# Patient Record
Sex: FEMALE | Race: WHITE
[De-identification: names, ages, dates, MRNs, and addresses within clinical notes are randomized per-mention and may not be internally consistent; named-entity substitution may affect disease eponyms.]

---

## 2019-01-29 ENCOUNTER — HOSPITAL ENCOUNTER (OUTPATIENT)
Dept: HOSPITAL 90 - RAH | Age: 69
Discharge: HOME | End: 2019-01-29
Attending: INTERNAL MEDICINE
Payer: MEDICARE

## 2019-01-29 DIAGNOSIS — S52.601A: ICD-10-CM
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
## 2019-01-29 DIAGNOSIS — S52.501A: Primary | ICD-10-CM
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
## 2019-01-29 PROCEDURE — 73120 X-RAY EXAM OF HAND: CPT

## 2019-01-29 PROCEDURE — 73090 X-RAY EXAM OF FOREARM: CPT

## 2019-01-29 PROCEDURE — 73100 X-RAY EXAM OF WRIST: CPT

## 2025-01-20 ENCOUNTER — HOSPITAL ENCOUNTER (EMERGENCY)
Dept: HOSPITAL 90 - EDH | Age: 75
Discharge: HOME | End: 2025-01-20
Payer: MEDICARE

## 2025-01-20 VITALS — SYSTOLIC BLOOD PRESSURE: 147 MMHG | OXYGEN SATURATION: 99 % | TEMPERATURE: 98.4 F | DIASTOLIC BLOOD PRESSURE: 84 MMHG

## 2025-01-20 VITALS — HEIGHT: 61 IN | BODY MASS INDEX: 20.77 KG/M2 | WEIGHT: 110.01 LBS

## 2025-01-20 VITALS — HEART RATE: 78 BPM | RESPIRATION RATE: 20 BRPM

## 2025-01-20 DIAGNOSIS — Z79.899: ICD-10-CM

## 2025-01-20 DIAGNOSIS — J45.901: Primary | ICD-10-CM

## 2025-01-20 LAB
BASOPHILS # BLD AUTO: 0.07 K/UL (ref 0–0.2)
BASOPHILS NFR BLD AUTO: 0.5 % (ref 0–5)
BNP SERPL-MCNC: 43 PG/ML (ref 0–100)
BUN SERPL-MCNC: 16 MG/DL (ref 7–18)
CHLORIDE SERPL-SCNC: 102 MMOL/L (ref 101–111)
CK SERPL-CCNC: 88 U/L (ref 21–232)
CO2 SERPL-SCNC: 29 MMOL/L (ref 21–32)
CREAT SERPL-MCNC: 1 MG/DL (ref 0.5–1)
EOSINOPHIL # BLD AUTO: 0.79 K/UL (ref 0–0.7)
EOSINOPHIL NFR BLD AUTO: 5.3 % (ref 0–8)
ERYTHROCYTE [DISTWIDTH] IN BLOOD BY AUTOMATED COUNT: 12.1 % (ref 11–15.5)
GFR SERPL CREATININE-BSD FRML MDRD: 60 ML/MIN (ref 90–?)
GLUCOSE SERPL-MCNC: 154 MG/DL (ref 70–105)
HCT VFR BLD AUTO: 44.8 % (ref 36–48)
IMM GRANULOCYTES # BLD: 0.09 K/UL (ref 0–1)
LYMPHOCYTES # SPEC AUTO: 2 K/UL (ref 1–4.8)
LYMPHOCYTES NFR SPEC AUTO: 13.2 % (ref 21–51)
MCH RBC QN AUTO: 31.3 PG (ref 27–33)
MCHC RBC AUTO-ENTMCNC: 33.5 G/DL (ref 32–36)
MCV RBC AUTO: 93.5 FL (ref 79–99)
MONOCYTES # BLD AUTO: 1 K/UL (ref 0.1–1)
MONOCYTES NFR BLD AUTO: 6.4 % (ref 3–13)
NEUTROPHILS # BLD AUTO: 10.9 K/UL (ref 1.8–7.7)
NEUTROPHILS NFR BLD AUTO: 74 % (ref 40–77)
NRBC BLD MANUAL-RTO: 0 % (ref 0–0.19)
PLATELET # BLD AUTO: 261 K/UL (ref 130–400)
POTASSIUM SERPL-SCNC: 4.2 MMOL/L (ref 3.5–5.1)
RBC # BLD AUTO: 4.79 MIL/UL (ref 4–5.5)
SODIUM SERPL-SCNC: 140 MMOL/L (ref 136–145)
WBC # BLD AUTO: 14.8 K/UL (ref 4.8–10.8)

## 2025-01-20 PROCEDURE — 82550 ASSAY OF CK (CPK): CPT

## 2025-01-20 PROCEDURE — 93005 ELECTROCARDIOGRAM TRACING: CPT

## 2025-01-20 PROCEDURE — 85025 COMPLETE CBC W/AUTO DIFF WBC: CPT

## 2025-01-20 PROCEDURE — 80048 BASIC METABOLIC PNL TOTAL CA: CPT

## 2025-01-20 PROCEDURE — 94640 AIRWAY INHALATION TREATMENT: CPT

## 2025-01-20 PROCEDURE — 96374 THER/PROPH/DIAG INJ IV PUSH: CPT

## 2025-01-20 PROCEDURE — 36415 COLL VENOUS BLD VENIPUNCTURE: CPT

## 2025-01-20 PROCEDURE — 99285 EMERGENCY DEPT VISIT HI MDM: CPT

## 2025-01-20 PROCEDURE — 71045 X-RAY EXAM CHEST 1 VIEW: CPT

## 2025-01-20 PROCEDURE — 83880 ASSAY OF NATRIURETIC PEPTIDE: CPT

## 2025-01-20 PROCEDURE — 84484 ASSAY OF TROPONIN QUANT: CPT

## 2025-01-20 RX ADMIN — ALBUTEROL SULFATE ONE MG: 2.5 SOLUTION RESPIRATORY (INHALATION) at 22:56

## 2025-01-20 NOTE — HMCIMG
CHEST 1VW



HISTORY: Dyspnea



COMPARISON: None



FINDINGS: A frontal projection of the chest was obtained. No acute

pulmonary infiltrates is seen.  The heart is normal in size.  Prominent

interstitial markings are seen. Calcified granuloma is seen in the right

midlung.  No evidence of aortic calcification is seen.



IMPRESSION:

1. No acute pulmonary infiltrate is seen.

## 2025-01-20 NOTE — ERN
General


Chief Complaint:  Shortness of Breath


Stated Complaint:  SHORTNESS OF BREATH


Time Seen by MD:  22:08





History of Present Illness


Initial Comments


74F BIB EMS from [facility] for respiratory distress. Patient has hx of asthma. She has

had cough & congestion for the past few weeks after a URI. She took a course of 

steroids & azithromycin. She has been using albuterol as needed. She had been 

improving up until today, where she became dyspneic. EMS was called. They found 

the patient wheezing, tripod position, with an O2 sat on RA of  75%. EMS gave 2 

duonebs enroute. On arrival the patient is much improved, speaking full 

sentences, without accessory muscle use, with mild tachypnea and wheezing. 


PCP: JAROD Nieto


Allergies:  


Coded Allergies:  


     alendronate sodium (Unverified  Allergy, Severe, PAIN TO NECK, 11/8/13)


     risedronate sodium (Unverified  Allergy, Severe, TERRIBLE PAIN ACROSS NECK,

11/8/13)


Home Meds


Reported Medications


Propranolol HCl (Propranolol HCl) 120 Mg Cap.sa.24h, 120 MG PO DAILY


   1/20/25


Topiramate (Topiramate) 25 Mg Cap.sprink, 25 MG PO BID, CAP.SPRINK


   1/20/25





Past Medical History


Past Medical History:  Asthma


Past Surgical History:  None





ROS Dictation


CONSTITUTIONAL:  No chills, no fever, no weakness, no diaphoresis, no malaise.


HEAD/FACE:  No signs of trauma. 


EENT:  No eye pain, no blurred vision, no tearing, no double vision, no ear 

pain, no ear discharge, no nose pain, no nasal congestion, no throat pain, no 

throat swelling, no mouth pain. 


RESPIRATORY:  Cough congestion and wheezing 


CARDIOVASCULAR:  No chest pain, no edema, no palpitations, no syncope. 


GASTROINTESTINAL/ABDOMINAL:   No abdominal pain, no constipation, no diarrhea, 

no nausea, no vomiting. 


GENITOURINARY:  No abnormal discharge, no dysuria, no frequent urination, no 

hematuria. No complaints of pain in the genitals. 


MUSCULOSKELETAL:  No back pain, no gout, no joint pain, no joint swelling, no 

muscle pain, no muscle stiffness, no neck pain. 


INTEGUMENTARY:  No change in color, no change in hair/nails, no dryness, no 

lesion, no lumps, no rash. 


NEUROLOGICAL/PSYCH:  No anxiety, not depressed, no emotional problem, no 

headache, no numbness, no pre-existing deficit, no history of seizures,  no 

tremors, no weakness. 


HEMATOLOGIC/LYMPHATIC:  Not anemic, no history of blood clots, no apparent 

bleeding, no bruising, glands not swollen.


All Systems Negative, Except as Noted.





Physical Exam


Physical Exam Dictation


VITAL SIGNS:  Reviewed. 


GENERAL APPEARANCE:  Alert, oriented x3, no acute distress


EYES:   PERRL, pink conjunctivas, eyelid no trauma, anterior chamber clear. 


EARS:  Pinnas intact and no signs of trauma or erythema.  Ear canals clear and 

no discharge. TMs no erythema. 


NOSE:  No discharge, no bleeding. 


OROPHARYNX:  Mouth normal, teeth no caries, tongue pink.  Pharynx clear, no 

erythema.  Tonsils no exudates, no abscesses noted. Mucous membrane moist.


NECK:  Supple, non-tender, no thyromegaly, no masses, no JVD, no bruits. 


BREAST:  Deferred.


CHEST:   No tenderness, no crepitus, no paradoxical movement, no retractions.


LUNGS:  moderate all lobes exp wheezing, speaking full sentences, no accessory 

muscle use, mild tachypnea, no swelling, no JVD, no hepatojugular reflux 


HEART:  Regular rate, regular rhythm, no murmur, no gallops. 


VASCULAR: No peripheral edema.


ABDOMEN: Soft, positive bowel sounds, nondistended, no guarding, nontender, no 

rebound, no masses no hepatomegaly, no splenomegaly, no Jose's sign, no 

hernias. 


RECTAL: Deferred. 


GENITAL:  Deferred. 


NEUROLOGICAL:  Normal speech, gross motor function intact, gross sensory 

function intact.


MUSCULOSKELETAL:  Neck nontender, full range of motion, back nontender, full 

range of motion.


EXTREMITIES: Nontender, full range of motion. 


SKIN:  Color pink, dry, no turgor, no rash, no lacerations, no abrasions, no 

contusions.


LYMPHATICS:  Deferred.





Results


Laboratory and Microbiology


Lab and Micro Result





Laboratory Tests








Test


 1/20/25


22:28


 


White Blood Count


 14.8 K/uL


(4.8-10.8)  H


 


Red Blood Count


 4.79 MIL/uL


(4.00-5.50)


 


Hemoglobin


 15.0 g/dL


(12.0-16.0)


 


Hematocrit


 44.8 % (36-48)





 


Mean Corpuscular Volume


 93.5 fL


(79-99)


 


Mean Corpuscular Hemoglobin


 31.3 pg


(27.0-33.0)


 


Mean Corpuscular Hemoglobin


Concent 33.5 g/dL


(32.0-36.0)


 


Red Cell Distribution Width


 12.1 %


(11.0-15.5)


 


Platelet Count


 261 K/uL


(130-400)


 


Mean Platelet Volume


 9.7 fL


(7.5-10.5)


 


Immature Granulocyte % (Auto) 0.6 % (0-1)  


 


Neutrophils (%) (Auto)


 74.0 %


(40.0-77.0)


 


Lymphocytes (%) (Auto)


 13.2 %


(21.0-51.0)  L


 


Monocytes (%) (Auto)


 6.4 %


(3.0-13.0)


 


Eosinophils (%) (Auto)


 5.3 %


(0.0-8.0)


 


Basophils (%) (Auto)


 0.5 %


(0.0-5.0)


 


Neutrophils # (Auto)


 10.9 K/uL


(1.8-7.7)  H


 


Lymphocytes # (Auto)


 2.0 K/uL


(1.0-4.8)


 


Monocytes # (Auto)


 1.0 K/uL


(0.1-1.0)


 


Eosinophils # (Auto)


 0.79 K/uL


(0.00-0.70)  H


 


Basophils # (Auto)


 0.07 K/uL


(0.00-0.20)


 


Absolute Immature Granulocyte


(auto 0.09 K/uL


(0-1)


 


Nucleated Red Blood Cells


 0.0 %


(0.0-0.19)


 


Sodium Level


 140 mmol/L


(136-145)


 


Potassium Level


 4.2 mmol/L


(3.5-5.1)


 


Chloride Level


 102 mmol/L


(101-111)


 


Carbon Dioxide Level


 29 mmol/L


(21-32)


 


Blood Urea Nitrogen


 16 mg/dL


(7-18)


 


Creatinine


 1.0 mg/dL


(0.5-1.0)


 


Glomerular Filtration Rate


Calc 60 mL/min


(>90)


 


Random Glucose


 154 mg/dL


()  H


 


Total Calcium


 9.9 mg/dL


(8.5-10.1)


 


Total Creatine Kinase


 88 U/L


()


 


Troponin I High Sensitivity


 17.7 ng/L


(4-50)


 


B-Type Natriuretic Peptide


 43 pg/mL


(0-100)











MDM





CC:  Respiratory distress/wheezing 


Historian:  Patient 


Comorbidities:  Asthma 


Limitations by social determinants of health:  None 


Differential diagnosis:  Asthma exacerbation, respiratory distress, other.  


Initial vital signs:  Tachypneic breathing 20 times a minute, otherwise 

unremarkable.  Oxygen saturation 95% on room air.


Clinically patient was having expiratory wheezing consistent with an asthma 

exacerbation.  


Labs: Mild leukocytosis 14.8 1000, patient has recently been taking steroids 

which is likely causing this elevation.  There is no shift or bands.  Chemistry 

panel is unremarkable.  Independently interpreted by me.  


Chest x-ray:  No focal infiltrates or cardiomegaly or vascular congestion 

independently interpreted by me.  


Patient received IV methylprednisolone, 7.5 mg of inhaled albuterol and 500 mcg 

of Atrovent.  


Re-evaluation:  Patient was much improved.  Oxygen saturation 99%.  He is 

speaking full sentences.  Very minimal wheezing lung exam.  At this point in 

time patient is safe for an outpatient workup/discharge.  We will discharge with

a Medrol Dosepak.  Patient already has not albuterol inhaler which she can use 

regularly.  She will follow up with Dr. Nieto as an outpatient.


ED Course





Orders








Procedure Category Date Status





  Time 


 


Arterial Blood Gas RT 1/20/25 Transmitted





  22:19 


 


Cbc With Differential LAB 1/20/25 Complete





  22:19 


 


B-Type Natriuretic LAB 1/20/25 Complete





Peptide  22:19 


 


Cardiac Panel LAB 1/20/25 Complete





  22:19 


 


Chest 1vw RAD 1/20/25 Resulted





  22:19 


 


12 Lead Ekg Tracing- EKG 1/20/25 Logged





Technical  22:19 


 


Albuterol 0.083% PHA 1/20/25 Complete





2.5mg/3ml (Proventil  22:30 


 


Ipratropium 0.5 PHA 1/20/25 Complete





Mg/2.5 Ml Inh  22:30 


 


Methylprednisolone PHA 1/20/25 Complete





Succ 125mg (Solu-Medr  22:30 


 


Basic Metabolic Panel LAB 1/20/25 Complete





  22:19 








Current Medications








 Medications


  (Trade)  Dose


 Ordered  Sig/Jenifer


 Route


 PRN Reason  Start Time


 Stop Time Status Last Admin


Dose Admin


 


 Albuterol Sulfate


  (Proventil


 0.083% 2.5mg/3ml)  7.5 mg  ONCE  ONCE


 IH


   1/20/25 22:30


 1/20/25 22:31 DC 1/20/25 22:56





 


 Ipratropium


 Bromide


  (AtrovENT UD)  0.5 mg  ONCE  ONCE


 IH


   1/20/25 22:30


 1/20/25 22:31 DC 1/20/25 22:56





 


 Methylprednisolone


 Sodium Succinate


  (Solu-medROL


 125MG)  125 mg  ONCE  ONCE


 IVP


   1/20/25 22:30


 1/20/25 22:31 DC 1/20/25 22:33











Vital Signs








  Date Time  Temp Pulse Resp B/P (MAP) Pulse Ox O2 Delivery O2 Flow Rate FiO2


 


1/20/25 22:57  78 20     


 


1/20/25 22:34 98.4 78 18 147/84 99 Room Air* 0 21


 


1/20/25 22:10 97.9 81 20 154/89 95 Room Air 0 











DX & DISP


Disposition:  Discharge


Departure


Impression:  


   Primary Impression:  Asthma exacerbation


Condition:  Stable


Scripts


Methylprednisolone (Medrol) 4 Mg Tab.ds.pk


1 TAB PO AD for 6 Days, #21 TAB 0 Refills


   6 on day 1 then reduce by one tablet daily until gone


   Prov: MARCELINA MICHAEL DO         1/20/25





Additional Instructions:  


You are having an asthma exacerbation.  This is likely caused by your recent 

viral respiratory infection.  





Your chest x-ray is clear.





Your lab work is unremarkable.  





As we discussed, I recommend that you take a course of steroids.  I have 

prescribed a Medrol Dosepak.  Take as prescribed.  Start these in the morning.  





I recommend that you take two puffs of your albuterol inhaler every 4 hours for 

the next 24-48 hours.  After that use as needed for wheezing or cough.  





You can also take over-the-counter cough and congestion medications.  





Please complete the course of antibiotics that you were prescribed by Dr. Nieto.  Continue with all other home medications.  





Please return to the emergency department if you have any concerning symptoms.





You can follow up with Dr. Nieto for re-evaluation later this week as needed.


Referrals:  


CHENTE NIETO MD (PCP)











MARCELINA MICHAEL DO                Jan 20, 2025 22:52

## 2025-01-21 NOTE — EKG
[facility]

                                       

Test Date:    2025               Test Time:    22:30:27

Pat Name:     CHAD SANTOS            Department:   ED

Patient ID:   [id number]           Room:          

Gender:       F                        Technician:   [id number]

:          1950               Requested By: MARCELINA MICHAEL

Order Number: [id number]        Reading MD:   Lavon Spencer

                                 Measurements

Intervals                              Axis          

Rate:         71                       P:            42

ID:           [id number]                      QRS:          -13

QRSD:         96                       T:            43

QT:           390                                    

QTc:          424                                    

                           Interpretive Statements

Sinus rhythm

No previous ECG available for comparison

Electronically Signed On 2025 17:08:41 CST by Lavon Spencer



Please click the below link to view image of tracing.